# Patient Record
Sex: FEMALE | NOT HISPANIC OR LATINO | Employment: FULL TIME | URBAN - METROPOLITAN AREA
[De-identification: names, ages, dates, MRNs, and addresses within clinical notes are randomized per-mention and may not be internally consistent; named-entity substitution may affect disease eponyms.]

---

## 2017-10-11 ENCOUNTER — HOSPITAL ENCOUNTER (EMERGENCY)
Facility: MEDICAL CENTER | Age: 22
End: 2017-10-11
Attending: EMERGENCY MEDICINE
Payer: MEDICAID

## 2017-10-11 ENCOUNTER — APPOINTMENT (OUTPATIENT)
Dept: RADIOLOGY | Facility: MEDICAL CENTER | Age: 22
End: 2017-10-11
Attending: EMERGENCY MEDICINE
Payer: MEDICAID

## 2017-10-11 VITALS
BODY MASS INDEX: 19.06 KG/M2 | TEMPERATURE: 97.9 F | OXYGEN SATURATION: 100 % | WEIGHT: 118.61 LBS | DIASTOLIC BLOOD PRESSURE: 78 MMHG | HEART RATE: 76 BPM | SYSTOLIC BLOOD PRESSURE: 131 MMHG | RESPIRATION RATE: 16 BRPM | HEIGHT: 66 IN

## 2017-10-11 DIAGNOSIS — R11.0 NAUSEA WITHOUT VOMITING: ICD-10-CM

## 2017-10-11 DIAGNOSIS — R10.32 LEFT LOWER QUADRANT PAIN: ICD-10-CM

## 2017-10-11 LAB
ALBUMIN SERPL BCP-MCNC: 4.6 G/DL (ref 3.2–4.9)
ALBUMIN/GLOB SERPL: 1.5 G/DL
ALP SERPL-CCNC: 55 U/L (ref 30–99)
ALT SERPL-CCNC: 10 U/L (ref 2–50)
ANION GAP SERPL CALC-SCNC: 9 MMOL/L (ref 0–11.9)
APPEARANCE UR: CLEAR
AST SERPL-CCNC: 21 U/L (ref 12–45)
BASOPHILS # BLD AUTO: 0 % (ref 0–1.8)
BASOPHILS # BLD: 0 K/UL (ref 0–0.12)
BILIRUB SERPL-MCNC: 0.5 MG/DL (ref 0.1–1.5)
BILIRUB UR QL STRIP.AUTO: NEGATIVE
BUN SERPL-MCNC: 13 MG/DL (ref 8–22)
CALCIUM SERPL-MCNC: 9.4 MG/DL (ref 8.5–10.5)
CHLORIDE SERPL-SCNC: 102 MMOL/L (ref 96–112)
CO2 SERPL-SCNC: 23 MMOL/L (ref 20–33)
COLOR UR: YELLOW
CREAT SERPL-MCNC: 0.77 MG/DL (ref 0.5–1.4)
CULTURE IF INDICATED INDCX: NO UA CULTURE
EOSINOPHIL # BLD AUTO: 0.11 K/UL (ref 0–0.51)
EOSINOPHIL NFR BLD: 1.8 % (ref 0–6.9)
ERYTHROCYTE [DISTWIDTH] IN BLOOD BY AUTOMATED COUNT: 38.8 FL (ref 35.9–50)
GFR SERPL CREATININE-BSD FRML MDRD: >60 ML/MIN/1.73 M 2
GLOBULIN SER CALC-MCNC: 3.1 G/DL (ref 1.9–3.5)
GLUCOSE SERPL-MCNC: 88 MG/DL (ref 65–99)
GLUCOSE UR STRIP.AUTO-MCNC: NEGATIVE MG/DL
HCG SERPL QL: NEGATIVE
HCT VFR BLD AUTO: 44.8 % (ref 37–47)
HGB BLD-MCNC: 15.6 G/DL (ref 12–16)
KETONES UR STRIP.AUTO-MCNC: ABNORMAL MG/DL
LEUKOCYTE ESTERASE UR QL STRIP.AUTO: NEGATIVE
LIPASE SERPL-CCNC: 10 U/L (ref 11–82)
LYMPHOCYTES # BLD AUTO: 3.04 K/UL (ref 1–4.8)
LYMPHOCYTES NFR BLD: 49.1 % (ref 22–41)
MANUAL DIFF BLD: NORMAL
MCH RBC QN AUTO: 32.7 PG (ref 27–33)
MCHC RBC AUTO-ENTMCNC: 34.8 G/DL (ref 33.6–35)
MCV RBC AUTO: 93.9 FL (ref 81.4–97.8)
MICRO URNS: ABNORMAL
MONOCYTES # BLD AUTO: 0.38 K/UL (ref 0–0.85)
MONOCYTES NFR BLD AUTO: 6.1 % (ref 0–13.4)
MORPHOLOGY BLD-IMP: NORMAL
NEUTROPHILS # BLD AUTO: 2.67 K/UL (ref 2–7.15)
NEUTROPHILS NFR BLD: 43 % (ref 44–72)
NITRITE UR QL STRIP.AUTO: NEGATIVE
NRBC # BLD AUTO: 0 K/UL
NRBC BLD AUTO-RTO: 0 /100 WBC
PH UR STRIP.AUTO: 7 [PH]
PLATELET # BLD AUTO: 239 K/UL (ref 164–446)
PLATELET BLD QL SMEAR: NORMAL
PMV BLD AUTO: 10 FL (ref 9–12.9)
POTASSIUM SERPL-SCNC: 4.2 MMOL/L (ref 3.6–5.5)
PROT SERPL-MCNC: 7.7 G/DL (ref 6–8.2)
PROT UR QL STRIP: NEGATIVE MG/DL
RBC # BLD AUTO: 4.77 M/UL (ref 4.2–5.4)
RBC BLD AUTO: NORMAL
RBC UR QL AUTO: NEGATIVE
SODIUM SERPL-SCNC: 134 MMOL/L (ref 135–145)
SP GR UR STRIP.AUTO: 1.02
UROBILINOGEN UR STRIP.AUTO-MCNC: 1 MG/DL
WBC # BLD AUTO: 6.2 K/UL (ref 4.8–10.8)

## 2017-10-11 PROCEDURE — 76830 TRANSVAGINAL US NON-OB: CPT

## 2017-10-11 PROCEDURE — 85027 COMPLETE CBC AUTOMATED: CPT

## 2017-10-11 PROCEDURE — 81003 URINALYSIS AUTO W/O SCOPE: CPT

## 2017-10-11 PROCEDURE — 83690 ASSAY OF LIPASE: CPT

## 2017-10-11 PROCEDURE — 80053 COMPREHEN METABOLIC PANEL: CPT

## 2017-10-11 PROCEDURE — 96375 TX/PRO/DX INJ NEW DRUG ADDON: CPT

## 2017-10-11 PROCEDURE — 96374 THER/PROPH/DIAG INJ IV PUSH: CPT

## 2017-10-11 PROCEDURE — 99285 EMERGENCY DEPT VISIT HI MDM: CPT

## 2017-10-11 PROCEDURE — 84703 CHORIONIC GONADOTROPIN ASSAY: CPT

## 2017-10-11 PROCEDURE — 700111 HCHG RX REV CODE 636 W/ 250 OVERRIDE (IP): Performed by: EMERGENCY MEDICINE

## 2017-10-11 PROCEDURE — 85007 BL SMEAR W/DIFF WBC COUNT: CPT

## 2017-10-11 RX ORDER — ONDANSETRON 2 MG/ML
4 INJECTION INTRAMUSCULAR; INTRAVENOUS ONCE
Status: COMPLETED | OUTPATIENT
Start: 2017-10-11 | End: 2017-10-11

## 2017-10-11 RX ORDER — KETOROLAC TROMETHAMINE 30 MG/ML
30 INJECTION, SOLUTION INTRAMUSCULAR; INTRAVENOUS ONCE
Status: COMPLETED | OUTPATIENT
Start: 2017-10-11 | End: 2017-10-11

## 2017-10-11 RX ORDER — AMOXICILLIN 250 MG
1 CAPSULE ORAL DAILY
Qty: 7 TAB | Refills: 0 | Status: SHIPPED | OUTPATIENT
Start: 2017-10-11 | End: 2017-10-18

## 2017-10-11 RX ADMIN — FENTANYL CITRATE 50 MCG: 50 INJECTION, SOLUTION INTRAMUSCULAR; INTRAVENOUS at 01:47

## 2017-10-11 RX ADMIN — KETOROLAC TROMETHAMINE 30 MG: 30 INJECTION, SOLUTION INTRAMUSCULAR at 03:27

## 2017-10-11 RX ADMIN — ONDANSETRON 4 MG: 2 INJECTION INTRAMUSCULAR; INTRAVENOUS at 01:47

## 2017-10-11 ASSESSMENT — PAIN SCALES - GENERAL
PAINLEVEL_OUTOF10: 6
PAINLEVEL_OUTOF10: 7
PAINLEVEL_OUTOF10: 7

## 2017-10-11 ASSESSMENT — LIFESTYLE VARIABLES: DO YOU DRINK ALCOHOL: NO

## 2017-10-11 NOTE — DISCHARGE INSTRUCTIONS
Abdominal Pain, Women  Abdominal (stomach, pelvic, or belly) pain can be caused by many things. It is important to tell your doctor:  · The location of the pain.  · Does it come and go or is it present all the time?  · Are there things that start the pain (eating certain foods, exercise)?  · Are there other symptoms associated with the pain (fever, nausea, vomiting, diarrhea)?  All of this is helpful to know when trying to find the cause of the pain.  CAUSES   · Stomach: virus or bacteria infection, or ulcer.  · Intestine: appendicitis (inflamed appendix), regional ileitis (Crohn's disease), ulcerative colitis (inflamed colon), irritable bowel syndrome, diverticulitis (inflamed diverticulum of the colon), or cancer of the stomach or intestine.  · Gallbladder disease or stones in the gallbladder.  · Kidney disease, kidney stones, or infection.  · Pancreas infection or cancer.  · Fibromyalgia (pain disorder).  · Diseases of the female organs:  · Uterus: fibroid (non-cancerous) tumors or infection.  · Fallopian tubes: infection or tubal pregnancy.  · Ovary: cysts or tumors.  · Pelvic adhesions (scar tissue).  · Endometriosis (uterus lining tissue growing in the pelvis and on the pelvic organs).  · Pelvic congestion syndrome (female organs filling up with blood just before the menstrual period).  · Pain with the menstrual period.  · Pain with ovulation (producing an egg).  · Pain with an IUD (intrauterine device, birth control) in the uterus.  · Cancer of the female organs.  · Functional pain (pain not caused by a disease, may improve without treatment).  · Psychological pain.  · Depression.  DIAGNOSIS   Your doctor will decide the seriousness of your pain by doing an examination.  · Blood tests.  · X-rays.  · Ultrasound.  · CT scan (computed tomography, special type of X-ray).  · MRI (magnetic resonance imaging).  · Cultures, for infection.  · Barium enema (dye inserted in the large intestine, to better view it with  X-rays).  · Colonoscopy (looking in intestine with a lighted tube).  · Laparoscopy (minor surgery, looking in abdomen with a lighted tube).  · Major abdominal exploratory surgery (looking in abdomen with a large incision).  TREATMENT   The treatment will depend on the cause of the pain.   · Many cases can be observed and treated at home.  · Over-the-counter medicines recommended by your caregiver.  · Prescription medicine.  · Antibiotics, for infection.  · Birth control pills, for painful periods or for ovulation pain.  · Hormone treatment, for endometriosis.  · Nerve blocking injections.  · Physical therapy.  · Antidepressants.  · Counseling with a psychologist or psychiatrist.  · Minor or major surgery.  HOME CARE INSTRUCTIONS   · Do not take laxatives, unless directed by your caregiver.  · Take over-the-counter pain medicine only if ordered by your caregiver. Do not take aspirin because it can cause an upset stomach or bleeding.  · Try a clear liquid diet (broth or water) as ordered by your caregiver. Slowly move to a bland diet, as tolerated, if the pain is related to the stomach or intestine.  · Have a thermometer and take your temperature several times a day, and record it.  · Bed rest and sleep, if it helps the pain.  · Avoid sexual intercourse, if it causes pain.  · Avoid stressful situations.  · Keep your follow-up appointments and tests, as your caregiver orders.  · If the pain does not go away with medicine or surgery, you may try:  · Acupuncture.  · Relaxation exercises (yoga, meditation).  · Group therapy.  · Counseling.  SEEK MEDICAL CARE IF:   · You notice certain foods cause stomach pain.  · Your home care treatment is not helping your pain.  · You need stronger pain medicine.  · You want your IUD removed.  · You feel faint or lightheaded.  · You develop nausea and vomiting.  · You develop a rash.  · You are having side effects or an allergy to your medicine.  SEEK IMMEDIATE MEDICAL CARE IF:   · Your  pain does not go away or gets worse.  · You have a fever.  · Your pain is felt only in portions of the abdomen. The right side could possibly be appendicitis. The left lower portion of the abdomen could be colitis or diverticulitis.  · You are passing blood in your stools (bright red or black tarry stools, with or without vomiting).  · You have blood in your urine.  · You develop chills, with or without a fever.  · You pass out.  MAKE SURE YOU:   · Understand these instructions.  · Will watch your condition.  · Will get help right away if you are not doing well or get worse.  Document Released: 10/14/2008 Document Revised: 03/11/2013 Document Reviewed: 11/04/2010  [employer]® Patient Information ©2014 [employer], [place].

## 2017-10-11 NOTE — ED PROVIDER NOTES
ED Provider Note    Scribed for Kallie Hester M.D. by Meir Connell. 10/11/2017, 1:15 AM.    Primary care provider: Pcp Pt States None  Means of arrival: walk-in  History obtained from: Patient  History limited by: none    CHIEF COMPLAINT  Chief Complaint   Patient presents with   • LLQ Pain     x2 days, pain radiating to back.   • Flank Pain     x2 days, left flank pain.   • Nausea     denies emesis   • Blood in Urine     spots of blood noted this evening.       HPI  Denia Gentile is a 22 y.o. female who presents to the Emergency Department for evaluation of abdominal pain onset two days ago. Per patient, her pain began as a dull pain in her mid abdomen, but yesterday her pain worsened and began radiating to her left lower abdomen and back. Her pain has been constant since onset, and she rates her pain as moderate. She endorses associated nausea, spotting. She took Tylenol at 9:00 PM tonight without relief. The patient states her last menstrual period was at the end of last month, and she states it is unlikely she is pregnant because her  had a vasectomy. She denies abnormal vaginal discharge, fevers, drug use, alcohol use.        REVIEW OF SYSTEMS  See HPI for further details. All other systems are negative.     C.      PAST MEDICAL HISTORY   has a past medical history of Anxiety (2013) and Depression (2013).    SURGICAL HISTORY  patient denies any surgical history    SOCIAL HISTORY  Social History   Substance Use Topics   • Smoking status: Never Smoker   • Smokeless tobacco: Never Used   • Alcohol use No      History   Drug Use No       FAMILY HISTORY  Family History   Problem Relation Age of Onset   • Cancer Mother      cervical   • Depression Father    • Anxiety disorder Father        CURRENT MEDICATIONS  Reviewed. See Encounter Summary.     ALLERGIES  Allergies   Allergen Reactions   • Lobster Swelling     Difficultly breathing, itchy throat; Ok with shrimp       PHYSICAL EXAM  VITAL  "SIGNS: /78   Pulse 90   Temp 36.6 °C (97.9 °F) (Temporal)   Resp 16   Ht 1.676 m (5' 6\")   Wt 53.8 kg (118 lb 9.7 oz)   LMP 02/18/2016   SpO2 100%   BMI 19.14 kg/m²    Pulse ox interpretation: I interpret this pulse ox as normal.  Constitutional: Alert in no apparent distress.  HENT: No signs of trauma, Bilateral external ears normal, Nose normal. Dry mucous membranes  Eyes: PERR, Conjunctiva normal, Non-icteric.   Neck: Normal range of motion, No tenderness, Supple, No stridor.   Lymphatic: No lymphadenopathy noted.   Cardiovascular: Regular rate and rhythm, no murmurs.   Thorax & Lungs: Normal breath sounds, No respiratory distress, No wheezing, No chest tenderness.   Abdomen: Bowel sounds normal, Soft, LLQ tenderness with voluntary guarding, no rebound. No RLQ tenderness.  No pulsatile masses. No peritoneal signs.  Skin: Warm, Dry, No erythema, No rash.   Back: No bony tenderness, No CVA tenderness.   Extremities: Intact distal pulses, No edema, No tenderness, No cyanosis  Musculoskeletal: Good range of motion in all major joints. No tenderness to palpation or major deformities noted.   Neurologic: Alert and oriented, No focal deficits noted.       DIFFERENTIAL DIAGNOSIS AND WORK UP PLAN    1:15 AM Patient seen and examined at bedside. The patient presents with LLQ pain and the differential diagnosis includes but is not limited to UTI, ovarian cyst, ovarian torsion, pyelonephritis, kidney stone. Ordered for transvaginal US, CBC with differential, CMP, HCG Qualitative Serum, UA, Lipase, estimated GFR to evaluate. Patient will be treated with 50 mcg Fentanyl, 4 mg Zofran for her symptoms. The treatment plan as above was discussed with the patient. She understood and verbalized agreement.     DIAGNOSTIC STUDIES / PROCEDURES     LABS  CBC within normal limits, CMP within normal limits, urinalysis without evidence of infection or hematuria, lipase negative patient is not pregnant  All labs were reviewed " "by me.    RADIOLOGY  US-GYN-PELVIS TRANSVAGINAL   Final Result      1.  Bilateral ovarian follicles.   2.  No evidence for ovarian torsion.   3.  Heterogeneous uterus, nonspecific.        The radiologist's interpretation of all radiological studies have been reviewed by me.    COURSE & MEDICAL DECISION MAKING  Pertinent Labs & Imaging studies reviewed. (See chart for details)    3:17 AM - I reevaluated the patient at bedside. Patient states her pain is improved, but not completely resolved. She is no longer nauseated. The patient was updated on her lab and imaging results as above. Patient declines a pelvic exam at this time. The patient was given return precautions and welcomed to return to the ED with new or worsening symptoms.  She understood and verbalized agreement.     /78   Pulse 76   Temp 36.6 °C (97.9 °F) (Temporal)   Resp 16   Ht 1.676 m (5' 6\")   Wt 53.8 kg (118 lb 9.7 oz)   LMP 02/18/2016   SpO2 100%   BMI 19.14 kg/m²      This is a 22 y.o. year old female who presents withLeft lower quadrant pain, dull and sharp, urinalysis within normal limits. Pelvis normal no evidence of an elevated white blood cell count concern for infection or renal stone the patient's abdomen is soft no guarding no rigidity she is feeling better. Possibly due to constipation versus atypical appendicitis however without fever vomiting or elevated white blood cell count unlikely at this time. I did discuss with the patient she is to return within 24 hours for any new or continued symptoms despite stool softeners at home. She'll return within 24 hours and understands the comfortable going home    /72   Pulse 76   Temp 36.6 °C (97.9 °F) (Temporal)   Resp 16   Ht 1.676 m (5' 6\")   Wt 53.8 kg (118 lb 9.7 oz)   LMP 02/18/2016   SpO2 100%   BMI 19.14 kg/m²       The patient will return for new or worsening symptoms and is stable at the time of discharge.    The patient is referred to a primary physician for " blood pressure management, diabetic screening, and for all other preventative health concerns.    DISPOSITION:  Patient will be discharged home in stable condition.    FOLLOW UP:  [facility], Emergency Dept  [address] [address] [phone]  In 1 day  If symptoms worsen - vomiting, fevers, pain in the R side    [facility]  [address] [place] [address]  [phone]  Schedule an appointment as soon as possible for a visit        OUTPATIENT MEDICATIONS:  New Prescriptions    SENNA-DOCUSATE (PERICOLACE OR SENOKOT S) 8.6-50 MG TAB    Take 1 Tab by mouth every day for 7 days.        FINAL IMPRESSION  1. Left lower quadrant pain    2. Nausea without vomiting          IMeir (Scribe), am scribing for, and in the presence of, Kallie Hester M.D..    Electronically signed by: Meir Connell (Scribe), 10/11/2017    IKallie M.D. personally performed the services described in this documentation, as scribed by Meir Connell in my presence, and it is both accurate and complete.    The note accurately reflects work and decisions made by me.  Kallie Hester  10/11/2017  5:46 AM    This dictation has been created using voice recognition software and/or scribes. The accuracy of the dictation is limited by the abilities of the software and the expertise of the scribes. I expect there may be some errors of grammar and possibly content. I made every attempt to manually correct the errors within my dictation. However, errors related to voice recognition software and/or scribes may still exist and should be interpreted within the appropriate context.

## 2017-10-11 NOTE — ED NOTES
Denia Amezquitaer  22 y.o.  Chief Complaint   Patient presents with   • LLQ Pain     x2 days, pain radiating to back.   • Flank Pain     x2 days, left flank pain.   • Nausea     denies emesis   • Blood in Urine     spots of blood noted this evening.     Patient given specimen cup, with education, verbalized understanding.      Explained wait time and triage process to pt. Pt placed back out in lobby, told to notify ED tech or triage RN of any changes, verbalized understanding.

## 2017-10-11 NOTE — ED NOTES
Assumed care of patient. Patient complains of LLQ abdominal pain that radiates into left flank with hematuria x all day.  Patient alert and oriented x 4. Patient denies any other complaints at this time. Patient side rails up x 2 and call bell within reach.

## 2021-02-05 ENCOUNTER — HOSPITAL ENCOUNTER (OUTPATIENT)
Facility: MEDICAL CENTER | Age: 26
End: 2021-02-05
Attending: NURSE PRACTITIONER
Payer: MEDICAID

## 2021-02-05 ENCOUNTER — OFFICE VISIT (OUTPATIENT)
Dept: URGENT CARE | Facility: CLINIC | Age: 26
End: 2021-02-05
Payer: MEDICAID

## 2021-02-05 VITALS
TEMPERATURE: 98.6 F | RESPIRATION RATE: 16 BRPM | HEART RATE: 84 BPM | DIASTOLIC BLOOD PRESSURE: 80 MMHG | SYSTOLIC BLOOD PRESSURE: 118 MMHG | HEIGHT: 66 IN | BODY MASS INDEX: 22.82 KG/M2 | WEIGHT: 142 LBS | OXYGEN SATURATION: 98 %

## 2021-02-05 DIAGNOSIS — R43.2 LOSS OF TASTE: ICD-10-CM

## 2021-02-05 DIAGNOSIS — Z20.822 SUSPECTED COVID-19 VIRUS INFECTION: ICD-10-CM

## 2021-02-05 PROCEDURE — U0003 INFECTIOUS AGENT DETECTION BY NUCLEIC ACID (DNA OR RNA); SEVERE ACUTE RESPIRATORY SYNDROME CORONAVIRUS 2 (SARS-COV-2) (CORONAVIRUS DISEASE [COVID-19]), AMPLIFIED PROBE TECHNIQUE, MAKING USE OF HIGH THROUGHPUT TECHNOLOGIES AS DESCRIBED BY CMS-2020-01-R: HCPCS

## 2021-02-05 PROCEDURE — 99203 OFFICE O/P NEW LOW 30 MIN: CPT | Performed by: NURSE PRACTITIONER

## 2021-02-05 PROCEDURE — U0005 INFEC AGEN DETEC AMPLI PROBE: HCPCS

## 2021-02-05 ASSESSMENT — ENCOUNTER SYMPTOMS
RHINORRHEA: 0
VOMITING: 0
COUGH: 0
SORE THROAT: 0
FEVER: 0
DIZZINESS: 0
SHORTNESS OF BREATH: 0
EYE REDNESS: 0
HEADACHES: 1
MYALGIAS: 0
NAUSEA: 0
CHILLS: 0

## 2021-02-05 NOTE — LETTER
February 5, 2021         Patient: Denia Gentile   YOB: 1995   Date of Visit: 2/5/2021           To Whom it May Concern:     Your employee was seen in our clinic today.  A concern for COVID-19 has been identified and testing is in progress.     We are asking you to excuse absences while following self-isolation protocol per Center for Disease Control (CDC) guidelines.  Your employee will be able to access test results through our electronic delivery system called [employer].     If the results of testing are negative, and once there has been no fever (temperature >100.4 F) for at least 72 hours without treatment, and no vomiting or diarrhea for at least 48 hours, then return to work is approved.    If the results of testing are positive then your employee will be contacted by the [facility] or [facility] department for further instructions on duration of self-isolation and return to work protocol. In general, this will also follow the CDC guidelines with a minimum of 10 days from the onset of symptoms and without fever, vomiting, or diarrhea as above.     In general, repeat testing is not necessary and not offered through our [facility].     This is the only note that will be provided from [facility] for this visit.  Your employee will require an appointment with a primary care provider if FMLA or disability forms are required.         If you have any questions please do not hesitate to call me at the phone number listed below.    Sincerely,      Hilario ROQUE  [phone]

## 2021-02-06 DIAGNOSIS — Z20.822 SUSPECTED COVID-19 VIRUS INFECTION: ICD-10-CM

## 2021-02-06 DIAGNOSIS — R43.2 LOSS OF TASTE: ICD-10-CM

## 2021-02-06 LAB
COVID ORDER STATUS COVID19: NORMAL
SARS-COV-2 RNA RESP QL NAA+PROBE: DETECTED
SPECIMEN SOURCE: ABNORMAL

## 2021-02-06 NOTE — PROGRESS NOTES
"Subjective:   Denia Gentile is a 25 y.o. female who presents for Sore Throat (loss sense of taste, smell, headache)      URI   This is a new problem. The current episode started yesterday. The problem has been gradually worsening. There has been no fever. Associated symptoms include headaches. Pertinent negatives include no chest pain, congestion, coughing, dysuria, ear pain, nausea, plugged ear sensation, rash, rhinorrhea, sore throat or vomiting. Associated symptoms comments: Loss of taste and smell  . She has tried acetaminophen for the symptoms. The treatment provided no relief.       Review of Systems   Constitutional: Negative for chills and fever.   HENT: Negative for congestion, ear pain, rhinorrhea and sore throat.         Loss of taste and smell     Eyes: Negative for redness.   Respiratory: Negative for cough and shortness of breath.    Cardiovascular: Negative for chest pain.   Gastrointestinal: Negative for nausea and vomiting.   Genitourinary: Negative for dysuria.   Musculoskeletal: Negative for myalgias.   Skin: Negative for rash.   Neurological: Positive for headaches. Negative for dizziness.       Medications:    • Prenatal Vitamins Tabs    Allergies: Lobster    Problem List: Denia Gentile does not have any active problems on file.    Surgical History:  No past surgical history on file.    Past Social Hx: Denia Gentile  reports that she has never smoked. She has never used smokeless tobacco. She reports that she does not drink alcohol or use drugs.     Past Family Hx:  Denia Gentile family history includes Anxiety disorder in her father; Cancer in her mother; Depression in her father.     Problem list, medications, and allergies reviewed by myself today in Epic.     Objective:     /80 (BP Location: Left arm, Patient Position: Sitting)   Pulse 84   Temp 37 °C (98.6 °F) (Temporal)   Resp 16   Ht 1.676 m (5' 6\")   Wt 64.4 kg (142 lb)   SpO2 98%  "  BMI 22.92 kg/m²     Physical Exam  Vitals signs and nursing note reviewed.   Constitutional:       General: She is not in acute distress.     Appearance: She is well-developed.   HENT:      Head: Normocephalic and atraumatic.      Right Ear: External ear normal.      Left Ear: External ear normal.      Nose: Nose normal.      Mouth/Throat:      Mouth: Mucous membranes are moist.   Eyes:      Conjunctiva/sclera: Conjunctivae normal.   Cardiovascular:      Rate and Rhythm: Normal rate.   Pulmonary:      Effort: Pulmonary effort is normal. No respiratory distress.      Breath sounds: Normal breath sounds.   Abdominal:      General: There is no distension.   Musculoskeletal: Normal range of motion.   Skin:     General: Skin is warm and dry.   Neurological:      General: No focal deficit present.      Mental Status: She is alert and oriented to person, place, and time. Mental status is at baseline.      Gait: Gait (gait at baseline) normal.   Psychiatric:         Judgment: Judgment normal.         Assessment/Plan:     Diagnosis and associated orders:     1. Loss of taste  COVID/SARS CoV-2 PCR   2. Suspected COVID-19 virus infection  COVID/SARS CoV-2 PCR      Comments/MDM:     Patient is a 25-year-old female present with stated above, patient in no acute respiratory distress, vital signs stable, pulse ox adequate.  Patient will be tested for COVID-19 at this time  Provided patient with self-isolation instructions  Provided ER precautions including worsening shortness of breath and high fever  Stressed the seriousness of isolation and prevention of transmissible disease  Instructed to take 1000 mg of Tylenol 3 times per day  Differential diagnosis, natural history, supportive care, and indications for immediate follow-up discussed.                  Please note that this dictation was created using voice recognition software. I have made a reasonable attempt to correct obvious errors, but I expect that there are errors of  grammar and possibly content that I did not discover before finalizing the note.    This note was electronically signed by Hilario ROQUE.

## 2021-07-27 ENCOUNTER — HOSPITAL ENCOUNTER (EMERGENCY)
Facility: MEDICAL CENTER | Age: 26
End: 2021-07-27
Attending: EMERGENCY MEDICINE
Payer: MEDICAID

## 2021-07-27 ENCOUNTER — APPOINTMENT (OUTPATIENT)
Dept: RADIOLOGY | Facility: MEDICAL CENTER | Age: 26
End: 2021-07-27
Attending: EMERGENCY MEDICINE
Payer: MEDICAID

## 2021-07-27 VITALS
BODY MASS INDEX: 21.9 KG/M2 | WEIGHT: 136.24 LBS | OXYGEN SATURATION: 97 % | TEMPERATURE: 98.1 F | HEIGHT: 66 IN | SYSTOLIC BLOOD PRESSURE: 116 MMHG | RESPIRATION RATE: 16 BRPM | HEART RATE: 78 BPM | DIASTOLIC BLOOD PRESSURE: 72 MMHG

## 2021-07-27 DIAGNOSIS — R20.2 PARESTHESIAS: ICD-10-CM

## 2021-07-27 DIAGNOSIS — N76.0 BACTERIAL VAGINOSIS: ICD-10-CM

## 2021-07-27 DIAGNOSIS — B96.89 BACTERIAL VAGINOSIS: ICD-10-CM

## 2021-07-27 DIAGNOSIS — N94.10 DYSPAREUNIA IN FEMALE: ICD-10-CM

## 2021-07-27 LAB
ALBUMIN SERPL BCP-MCNC: 4.7 G/DL (ref 3.2–4.9)
ALBUMIN/GLOB SERPL: 1.7 G/DL
ALP SERPL-CCNC: 73 U/L (ref 30–99)
ALT SERPL-CCNC: 31 U/L (ref 2–50)
ANION GAP SERPL CALC-SCNC: 15 MMOL/L (ref 7–16)
APPEARANCE UR: CLEAR
AST SERPL-CCNC: 26 U/L (ref 12–45)
BACTERIA GENITAL QL WET PREP: NORMAL
BASOPHILS # BLD AUTO: 0.3 % (ref 0–1.8)
BASOPHILS # BLD: 0.02 K/UL (ref 0–0.12)
BILIRUB SERPL-MCNC: 0.7 MG/DL (ref 0.1–1.5)
BILIRUB UR QL STRIP.AUTO: NEGATIVE
BUN SERPL-MCNC: 10 MG/DL (ref 8–22)
CALCIUM SERPL-MCNC: 9.4 MG/DL (ref 8.4–10.2)
CHLORIDE SERPL-SCNC: 103 MMOL/L (ref 96–112)
CO2 SERPL-SCNC: 21 MMOL/L (ref 20–33)
COLOR UR: NORMAL
CREAT SERPL-MCNC: 0.63 MG/DL (ref 0.5–1.4)
EKG IMPRESSION: NORMAL
EOSINOPHIL # BLD AUTO: 0.07 K/UL (ref 0–0.51)
EOSINOPHIL NFR BLD: 1.1 % (ref 0–6.9)
ERYTHROCYTE [DISTWIDTH] IN BLOOD BY AUTOMATED COUNT: 38.3 FL (ref 35.9–50)
GLOBULIN SER CALC-MCNC: 2.8 G/DL (ref 1.9–3.5)
GLUCOSE SERPL-MCNC: 92 MG/DL (ref 65–99)
GLUCOSE UR STRIP.AUTO-MCNC: NEGATIVE MG/DL
HCG UR QL: NEGATIVE
HCT VFR BLD AUTO: 41.8 % (ref 37–47)
HGB BLD-MCNC: 15 G/DL (ref 12–16)
IMM GRANULOCYTES # BLD AUTO: 0.02 K/UL (ref 0–0.11)
IMM GRANULOCYTES NFR BLD AUTO: 0.3 % (ref 0–0.9)
KETONES UR STRIP.AUTO-MCNC: NEGATIVE MG/DL
LEUKOCYTE ESTERASE UR QL STRIP.AUTO: NEGATIVE
LIPASE SERPL-CCNC: 19 U/L (ref 7–58)
LYMPHOCYTES # BLD AUTO: 1.91 K/UL (ref 1–4.8)
LYMPHOCYTES NFR BLD: 30 % (ref 22–41)
MCH RBC QN AUTO: 33.7 PG (ref 27–33)
MCHC RBC AUTO-ENTMCNC: 35.9 G/DL (ref 33.6–35)
MCV RBC AUTO: 93.9 FL (ref 81.4–97.8)
MICRO URNS: NORMAL
MONOCYTES # BLD AUTO: 0.45 K/UL (ref 0–0.85)
MONOCYTES NFR BLD AUTO: 7.1 % (ref 0–13.4)
NEUTROPHILS # BLD AUTO: 3.9 K/UL (ref 2–7.15)
NEUTROPHILS NFR BLD: 61.2 % (ref 44–72)
NITRITE UR QL STRIP.AUTO: NEGATIVE
NRBC # BLD AUTO: 0 K/UL
NRBC BLD-RTO: 0 /100 WBC
PH UR STRIP.AUTO: 7 [PH] (ref 5–8)
PLATELET # BLD AUTO: 276 K/UL (ref 164–446)
PMV BLD AUTO: 8.3 FL (ref 9–12.9)
POTASSIUM SERPL-SCNC: 3.5 MMOL/L (ref 3.6–5.5)
PROT SERPL-MCNC: 7.5 G/DL (ref 6–8.2)
PROT UR QL STRIP: NEGATIVE MG/DL
RBC # BLD AUTO: 4.45 M/UL (ref 4.2–5.4)
RBC UR QL AUTO: NEGATIVE
SIGNIFICANT IND 70042: NORMAL
SITE SITE: NORMAL
SODIUM SERPL-SCNC: 139 MMOL/L (ref 135–145)
SOURCE SOURCE: NORMAL
SP GR UR STRIP.AUTO: 1.01
WBC # BLD AUTO: 6.4 K/UL (ref 4.8–10.8)

## 2021-07-27 PROCEDURE — 700102 HCHG RX REV CODE 250 W/ 637 OVERRIDE(OP): Performed by: EMERGENCY MEDICINE

## 2021-07-27 PROCEDURE — 700105 HCHG RX REV CODE 258: Performed by: EMERGENCY MEDICINE

## 2021-07-27 PROCEDURE — 93005 ELECTROCARDIOGRAM TRACING: CPT | Performed by: EMERGENCY MEDICINE

## 2021-07-27 PROCEDURE — 81003 URINALYSIS AUTO W/O SCOPE: CPT

## 2021-07-27 PROCEDURE — 81025 URINE PREGNANCY TEST: CPT

## 2021-07-27 PROCEDURE — 87491 CHLMYD TRACH DNA AMP PROBE: CPT

## 2021-07-27 PROCEDURE — 99285 EMERGENCY DEPT VISIT HI MDM: CPT

## 2021-07-27 PROCEDURE — 83690 ASSAY OF LIPASE: CPT

## 2021-07-27 PROCEDURE — A9270 NON-COVERED ITEM OR SERVICE: HCPCS | Performed by: EMERGENCY MEDICINE

## 2021-07-27 PROCEDURE — 87591 N.GONORRHOEAE DNA AMP PROB: CPT

## 2021-07-27 PROCEDURE — 85025 COMPLETE CBC W/AUTO DIFF WBC: CPT

## 2021-07-27 PROCEDURE — 80053 COMPREHEN METABOLIC PANEL: CPT

## 2021-07-27 PROCEDURE — 70450 CT HEAD/BRAIN W/O DYE: CPT

## 2021-07-27 RX ORDER — MAGNESIUM OXIDE 400 MG/1
400 TABLET ORAL DAILY
COMMUNITY

## 2021-07-27 RX ORDER — POTASSIUM CHLORIDE 20 MEQ/1
20 TABLET, EXTENDED RELEASE ORAL ONCE
Status: COMPLETED | OUTPATIENT
Start: 2021-07-27 | End: 2021-07-27

## 2021-07-27 RX ORDER — METRONIDAZOLE 500 MG/1
500 TABLET ORAL 2 TIMES DAILY
Qty: 14 TABLET | Refills: 0 | Status: SHIPPED | OUTPATIENT
Start: 2021-07-27 | End: 2021-08-03

## 2021-07-27 RX ORDER — SODIUM CHLORIDE 9 MG/ML
1000 INJECTION, SOLUTION INTRAVENOUS ONCE
Status: COMPLETED | OUTPATIENT
Start: 2021-07-27 | End: 2021-07-27

## 2021-07-27 RX ADMIN — POTASSIUM CHLORIDE 20 MEQ: 1500 TABLET, EXTENDED RELEASE ORAL at 21:10

## 2021-07-27 RX ADMIN — SODIUM CHLORIDE 1000 ML: 9 INJECTION, SOLUTION INTRAVENOUS at 21:04

## 2021-07-28 LAB
C TRACH DNA SPEC QL NAA+PROBE: NEGATIVE
N GONORRHOEA DNA SPEC QL NAA+PROBE: NEGATIVE
SPECIMEN SOURCE: NORMAL

## 2021-07-28 NOTE — DISCHARGE INSTRUCTIONS
Please follow-up with your primary care physician within 24 hours for complete recheck.  Call at the opening of business hours tomorrow morning to schedule a follow-up appointment tomorrow for complete recheck.  Return to the emergency department for recheck if your symptoms do not completely go away, and you are not able to follow-up with primary care.  Additionally, please return if you develop any new or worsening symptoms, this includes worsening tingling sensation, nausea or vomiting, fevers, weakness in the arms or legs, or if you have any further concerns.

## 2021-07-28 NOTE — ED PROVIDER NOTES
ED Provider Note    Chief Complaint:   Paresthesias, possible vaginal foreign body    HPI:  Denia Gentile is a very pleasant 26-year-old woman who presents to the emergency department today for evaluation of paresthesias, and possible vaginal foreign body.  Her symptoms began 2 to 3 hours prior to arrival.  She describes a sensation of tingling and paresthesias initially localized to the arms, now localized to the face.  States symptoms have been ongoing.  She also reports some associated lightheadedness when standing.  She has not had any localizable weakness to the arms or legs, does not describe symptoms of vertigo, has not had any associated vomiting.  She does have some associated abdominal pain is described as radiating towards the upper abdomen.  Additionally, she reports dyspareunia, and is worried that she may have a retained tampon that is been in place for the past 5 to 6 days.  At this time, she is not having any vaginal discharge, and no active dysuria.  She reports no hematuria.  She had no recent fevers, cough, or shortness of breath.  She did have COVID-19 several months ago, has not had pneumonic symptoms since that time.  She is unable to identify any exacerbating or alleviating factors.    Review of Systems:  See HPI for pertinent positives and negatives. All other systems negative.    Past Medical History:   has a past medical history of Anxiety (2013) and Depression (2013).    Social History:  Social History     Tobacco Use   • Smoking status: Never Smoker   • Smokeless tobacco: Never Used   Vaping Use   • Vaping Use: Never used   Substance and Sexual Activity   • Alcohol use: Yes     Alcohol/week: 4.2 oz     Types: 7 Glasses of wine per week     Comment: 1 glass wine nightly   • Drug use: No   • Sexual activity: Yes     Partners: Male     Birth control/protection: Condom     Comment: No birth control       Surgical History:  patient denies any surgical history    Current  "Medications:  Home Medications     Reviewed by Elaine Saunders R.N. (Registered Nurse) on 07/27/21 at 1943  Med List Status: Complete   Medication Last Dose Status   APPLE CIDER VINEGAR PO  Active   magnesium oxide (MAG-OX) 400 MG Tab tablet  Active                Allergies:  Allergies   Allergen Reactions   • Lobster Swelling     Difficultly breathing, itchy throat; Ok with shrimp       Physical Exam:  Vital Signs: /79   Pulse 91   Temp 36.7 °C (98.1 °F) (Temporal)   Resp 18   Ht 1.676 m (5' 6\")   Wt 61.8 kg (136 lb 3.9 oz)   LMP 07/14/2021 (Within Days)   SpO2 98%   BMI 21.99 kg/m²   Constitutional: Alert, no acute distress  HENT: Normocephalic, mask in place  Eyes: Pupils equal and reactive, normal conjunctiva  Neck: Supple, normal range of motion, no stridor  Cardiovascular: Extremities are warm and well perfused, no murmur appreciated, normal cardiac auscultation  Pulmonary: No respiratory distress, normal work of breathing, no accessory muscule usage, breath sounds clear and equal bilaterally  Abdomen: Soft, non-distended, non-tender to palpation, no peritoneal signs  : Pelvic: Normal external genitalia. Normal appearing cervix, os closed. No cervical motion tenderness. No evidence of blood in vaginal vault, scant discharge consistent with physiologic discharge.  No foreign body in the vaginal vault.  Skin: Warm, dry, no rashes or lesions  Musculoskeletal: Normal range of motion in all extremities, no swelling or deformity noted  Neurologic: CN II-XII intact, speech normal, muscle strength 5/5 in all four extremities, sensation grossly intact, normal finger-to-nose, no pronator drift  Psychiatric: Normal and appropriate mood and affect    Medical records reviewed for continuity of care.  No recent visits for similar symptoms.  She was seen in this emergency department in 2017 for flank pain and abdominal pain.  Emergency department evaluation was reassuring, she was discharged home in stable " condition.  No clear etiology found at this visit.    EKG: Rate 71, normal sinus rhythm, no ST elevation or depression, no pathologic T wave inversions, no ectopy.    Labs:  Labs Reviewed   CBC WITH DIFFERENTIAL - Abnormal; Notable for the following components:       Result Value    MCH 33.7 (*)     MCHC 35.9 (*)     MPV 8.3 (*)     All other components within normal limits   COMP METABOLIC PANEL - Abnormal; Notable for the following components:    Potassium 3.5 (*)     All other components within normal limits   LIPASE   URINALYSIS,CULTURE IF INDICATED    Narrative:     Indication for culture:->Patient WITHOUT an indwelling Becker  catheter in place with new onset of Dysuria, Frequency,  Urgency, and/or Suprapubic pain   HCG QUALITATIVE UR    Narrative:     Indication for culture:->Patient WITHOUT an indwelling Becker  catheter in place with new onset of Dysuria, Frequency,  Urgency, and/or Suprapubic pain   ESTIMATED GFR   WET PREP   CHLAMYDIA/GC PCR URINE OR SWAB       Radiology:  CT-HEAD W/O   Final Result      Normal noncontrast head CT.           ED Medications Administered:  Medications   potassium chloride SA (Kdur) tablet 20 mEq (20 mEq Oral Given 7/27/21 2110)   NS infusion 1,000 mL (0 mL Intravenous Stopped 7/27/21 2221)       Differential diagnosis:  Electrolyte abnormality, orthostatic hypotension, lightheadedness, retained foreign body    MDM:  Ms. Gentile presents to the emergency department today for evaluation of tingling.  Additionally, she is concerned that she may have a retained tampon.  On arrival to the emergency department her vital signs are reassuring, she has no tachycardia, no hypotension, she has had no fevers.  No evidence of infectious etiology.  She has no symptoms concerning for COVID-19.  Her neurologic exam is normal without focal deficits, she has no associated headache, no symptoms of cerebellar dysfunction, doubt CVA. Pelvic exam feels no foreign body, small amount of discharge,  possibly physiologic though bacterial vaginosis is a consideration as well.  She has no lower extremity weakness, no preceding viral illness, symptoms are less consistent with Guillain-Barré.    On laboratory evaluation she has no significant electrolyte abnormalities.  Potassium is just below normal reference range at 3.5, otherwise CMP is entirely within normal limits.  Lipase is normal without evidence of pancreatitis.  She is a normal white blood count, hemoglobin is normal at 15, no evidence of symptomatic anemia.  Platelet count is normal.  Urinalysis is negative for evidence of infection, negative for occult blood, negative for ketones, negative for protein.  Wet prep demonstrates few clue cells.  GC chlamydia testing sent, that result is pending at this time.    She was treated with a dose of oral potassium, as well as IV fluid bolus out of concern for symptoms of orthostatic hypotension.  Due to lightheadedness with standing, rapid rehydration is necessary, at this time she is not able to drink a significant amount of oral fluids, oral fluid challenge is not appropriate.  On reassessment after receiving IV fluids her paresthesias in the upper extremities have improved, however he is reporting paresthesias in the lower extremities.  She has never had similar symptoms.  I did obtain a CT of the head to verify no intracranial mass lesion.  This is negative for acute pathology.  No abnormalities identified.    My reassessment, she continues to improve.  At this time, etiology of her paresthesias are unclear, however I do not believe she requires any further emergent diagnostics nor treatment, nor do I believe she requires inpatient admission at this time.  Plan at this time is for discharge home.  I will prescribe her metronidazole, in the event that her pelvic discomfort and scant discharge is due to bacterial vaginosis, as clue cells are present on wet prep.  She is counseled to follow-up with her primary  care physician within 24 hours for complete recheck. Return precautions were discussed with the patient, and provided in written form with the patient's discharge instructions.     Personal protective equipment including N95 surgical respirator, goggles, and gloves were used during this encounter.       Disposition:  Discharge home in stable condition    Final Impression:  1. Paresthesias    2. Dyspareunia in female    3. Bacterial vaginosis        Electronically signed by: Yun Avendano MD, 7/27/2021 11:35 PM

## 2021-07-28 NOTE — ED TRIAGE NOTES
"Chief Complaint   Patient presents with   • Numbness     onset 2 hours ago, numb/tingling sensations moving around body.   • Abdominal Pain     onset 15 minutes after numb/tingles began.   • Tampon Removal     Possible retained tampon since wednesday.     ED Triage Vitals [07/27/21 1910]   Enc Vitals Group      Blood Pressure 147/70      Pulse 88      Respiration 16      Temperature 36.7 °C (98.1 °F)      Temp src Temporal      Pulse Oximetry 100 %      Weight 61.8 kg (136 lb 3.9 oz)      Height 1.676 m (5' 6\")     "

## 2021-07-28 NOTE — ED NOTES
"Pt states s/s to legs have resolved. Pt states arm and face now just intermittently \"zinging\" from face down to arm.   "

## 2021-07-28 NOTE — ED NOTES
"Pt reports she was sitting at a table at home around 1715 and felt light headache; pt then got a headache and numbness going down left side of face but then down both arms and legs. Bottom of both feet still feel tingly and pt reports the left arm was very tingling and painful when BP was taken here.     Pt also with bilateral lower abdominal pain that \"shoot up\" the center. Pt has had three episodes of \"squirts\" (small diarrhea) since 1715.    Pt unsure if she has a tampon still in her vagina; she could not feel anything in there and is not a discharge or odor. Pt states \"sometimes\" it hurts when she pees since Wednesday but not always.    Pt alert, oriented, ambulatory.     Equal , equal strength in limbs, symmetrical face movements.   "

## 2021-07-28 NOTE — ED NOTES
Patient discharged home in stable condition  AVS provided with recommended follow up and home care instructions and education information  Prescription for flagyl electronically provided at this time  Patient verbalized understanding  Ambulatory at time of discharge